# Patient Record
Sex: MALE | Race: OTHER
[De-identification: names, ages, dates, MRNs, and addresses within clinical notes are randomized per-mention and may not be internally consistent; named-entity substitution may affect disease eponyms.]

---

## 2021-01-13 ENCOUNTER — HOSPITAL ENCOUNTER (OUTPATIENT)
Dept: HOSPITAL 61 - RAD | Age: 61
End: 2021-01-13
Attending: SURGERY
Payer: COMMERCIAL

## 2021-01-13 DIAGNOSIS — M25.461: ICD-10-CM

## 2021-01-13 DIAGNOSIS — M17.11: Primary | ICD-10-CM

## 2021-01-13 PROCEDURE — 73560 X-RAY EXAM OF KNEE 1 OR 2: CPT

## 2021-01-13 NOTE — RAD
XR KNEE_RT 1-2 VIEWS 1/13/2021 11:47 AM



INDICATION: Right knee pain



COMPARISON: None available.



TECHNIQUE:  2 views the right knee are provided.



FINDINGS/

IMPRESSION:



1. No acute fracture or dislocation.

2. Moderate medial femorotibial and patellofemoral joint space narrowing with marginal osteophytosis 
compatible with moderate osteoarthrosis.

3. Moderate knee joint effusion.



Electronically signed by: Bridget Henderson MD (1/13/2021 5:41 PM) [id number]